# Patient Record
Sex: FEMALE | URBAN - METROPOLITAN AREA
[De-identification: names, ages, dates, MRNs, and addresses within clinical notes are randomized per-mention and may not be internally consistent; named-entity substitution may affect disease eponyms.]

---

## 2018-10-03 ENCOUNTER — OUTPATIENT (OUTPATIENT)
Dept: OUTPATIENT SERVICES | Facility: HOSPITAL | Age: 18
LOS: 1 days | End: 2018-10-03

## 2018-10-03 ENCOUNTER — APPOINTMENT (OUTPATIENT)
Dept: PEDIATRIC ADOLESCENT MEDICINE | Facility: CLINIC | Age: 18
End: 2018-10-03

## 2018-10-03 VITALS
HEART RATE: 76 BPM | SYSTOLIC BLOOD PRESSURE: 113 MMHG | DIASTOLIC BLOOD PRESSURE: 72 MMHG | BODY MASS INDEX: 17.04 KG/M2 | HEIGHT: 57 IN | WEIGHT: 79 LBS

## 2018-10-03 DIAGNOSIS — Z00.00 ENCOUNTER FOR GENERAL ADULT MEDICAL EXAMINATION W/OUT ABNORMAL FINDINGS: ICD-10-CM

## 2018-10-03 DIAGNOSIS — Z82.3 FAMILY HISTORY OF STROKE: ICD-10-CM

## 2018-10-03 DIAGNOSIS — Z11.4 ENCOUNTER FOR SCREENING FOR HUMAN IMMUNODEFICIENCY VIRUS [HIV]: ICD-10-CM

## 2018-10-03 DIAGNOSIS — Z11.3 ENCOUNTER FOR SCREENING FOR INFECTIONS WITH A PREDOMINANTLY SEXUAL MODE OF TRANSMISSION: ICD-10-CM

## 2018-10-04 PROBLEM — Z11.4 SCREENING FOR HIV (HUMAN IMMUNODEFICIENCY VIRUS): Status: ACTIVE | Noted: 2018-10-04

## 2018-10-04 PROBLEM — Z00.00 ENCOUNTER FOR GENERAL ADULT MEDICAL EXAMINATION W/O ABNORMAL FINDINGS: Status: ACTIVE | Noted: 2018-10-04

## 2018-10-04 NOTE — HISTORY OF PRESENT ILLNESS
[Mother] : mother [Brushes ___ Times Daily] : brushes [unfilled] times daily [Sleeps From ___] : from [unfilled] [Sleeps Until ___] : until [unfilled] [de-identified] : last dental visit in 2014 [de-identified] : with mother  [FreeTextEntry1] : 18 year old female for well visit. Pt started the [facility] program at [address] in September 2018.  \par \par Pt was born in [facility] and moved to [place] in 2014. Pt feels well. No complaints. Pt reports stress related to recent divorce. Pt  at age 16 and recently . Pt now lives with her parents. \par \par Pt reports that her parents are a few inches taller than her but many other families are also "short." \par \par Menarche: age 9. Prior to summer of 2018 pt had a regular, monthly period. Pt reports irregular bleeding this past summer with menstrual period last ~ 2 months and then resumed regular, monthly periods. Pt has never been on birth control. \par \par Pt is not planning a pregnancy in the next year and plans to be abstinent. Last sexual activity was in August of 2017. Pt never had testing for STIs and HIV. Pt rarely uses condoms.

## 2018-10-04 NOTE — PHYSICAL EXAM
[General Appearance - Alert] : alert [General Appearance - Well-Appearing] : well appearing [General Appearance - In No Acute Distress] : in no acute distress [General Appearance - Well Nourished] : well nourished [General Appearance - Well Developed] : well developed [Appearance Of Head] : the head was normocephalic [Evidence Of Head Injury] : threre was no evidence of injury [Sclera] : the conjunctiva were normal [PERRL With Normal Accommodation] : the pupils were equal in size, round, and reactive to light [EOMI] : ~T the extraocular movements were intact [Outer Ear] : the ears and nose were normal in appearance [Both Tympanic Membranes Were Examined] : both tympanic membranes were normal [Hearing Threshold Finger Rub Not LoÃ­za] : grossly normal hearing [Nasal Cavity] : the nasal mucosa and septum were normal [Examination Of The Oral Cavity] : the teeth, gums, and palate were normal [Oropharynx] : the oropharynx was normal  [Thyroid Diffuse Enlargement] : the thyroid was not enlarged [Thyroid Nodule] : there were no palpable thyroid nodules [Respiration, Rhythm And Depth] : normal respiratory rhythm and effort [Exaggerated Use Of Accessory Muscles For Inspiration] : no accessory muscle use [Auscultation Breath Sounds / Voice Sounds] : clear bilateral breath sounds [Chest Palpation] : palpation of the chest revealed no abnormalities [Heart Rate And Rhythm] : heart rate and rhythm were normal [Heart Sounds] : normal S1 and S2 [Murmurs] : no murmurs [Bowel Sounds] : normal bowel sounds [Abdomen Soft] : soft [Abdomen Tenderness] : non-tender [Abdominal Distention] : nondistended [Abdomen Mass (___ Cm)] : no abdominal mass palpated [Abnormal Walk] : normal gait [Nail Clubbing] : no clubbing  or cyanosis of the fingernails [Musculoskeletal - Swelling] : no joint swelling seen [Musculoskeletal Exam: Normal Movement Of All Extremities] : normal movements of all extremities [Motor Tone] : muscle strength and tone were normal [No Visual Abnormalities] : no visible abnormailities [No Tenderness to Palpation] : no spine tenderness on palpation [Intact] : all reflexes within normal limits bilaterally [Cranial Nerves] : cranial nerves 2-12 were intact [Deep Tendon Reflexes (DTR)] : deep tendon reflexes were 2+ and symmetric [Sensation] : the sensory exam was normal to light touch and pinprick [Motor Exam] : the motor exam was normal [Cervical Lymph Nodes Enlarged Posterior Bilaterally] : posterior cervical [Cervical Lymph Nodes Enlarged Anterior Bilaterally] : anterior cervical [Skin Color & Pigmentation] : normal skin color and pigmentation [] : well perfused [Skin Turgor] : normal skin turgor [Initial Inspection: Infant Active And Alert] : active and alert [Demonstrated Behavior - Infant Nonreactive To Parents] : interactive [Del Stage ___] : the Del stage for pubic hair development was [unfilled]  [FreeTextEntry1] : Breast Del V

## 2018-10-04 NOTE — DEVELOPMENTAL MILESTONES
[0] : 2) Feeling down, depressed, or hopeless: Not at all (0) [Mother] : mother [Father] : father [Sexually Active] : The patient is sexually active [[id number]] : 0 [FreeTextEntry6] : ISABEL

## 2018-10-04 NOTE — DISCUSSION/SUMMARY
[FreeTextEntry1] : 18 year old female for well visit. \par -Well young adult. \par -Bring in vaccine record - no record found in [facility].\par -Anemia screening done - ordered CBC.\par -Cholesterol screening done - ordered lipid profile.\par -STI testing done - ordered GC/CT.\par -HIV testing done.\par -Counseled regarding dental hygiene, healthy relationships, seatbelt safety, and healthy nutrition.\par -Counseled on contraceptive options. Pt plans to be abstinent. \par -Offered counseling services for support with recent divorce. Pt declined services. \par -Return to the health center as needed.

## 2018-10-05 LAB — HIV1+2 AB SPEC QL IA.RAPID: NONREACTIVE

## 2018-10-06 LAB
C TRACH RRNA SPEC QL NAA+PROBE: NOT DETECTED
CHOLEST SERPL-MCNC: 178 MG/DL
CHOLEST/HDLC SERPL: 3.9 RATIO
HDLC SERPL-MCNC: 46 MG/DL
LDLC SERPL CALC-MCNC: 120 MG/DL
N GONORRHOEA RRNA SPEC QL NAA+PROBE: NOT DETECTED
SOURCE AMPLIFICATION: NORMAL
TRIGL SERPL-MCNC: 61 MG/DL

## 2018-12-18 DIAGNOSIS — Z11.4 ENCOUNTER FOR SCREENING FOR HUMAN IMMUNODEFICIENCY VIRUS [HIV]: ICD-10-CM

## 2018-12-18 DIAGNOSIS — Z00.00 ENCOUNTER FOR GENERAL ADULT MEDICAL EXAMINATION WITHOUT ABNORMAL FINDINGS: ICD-10-CM

## 2018-12-18 DIAGNOSIS — Z11.3 ENCOUNTER FOR SCREENING FOR INFECTIONS WITH A PREDOMINANTLY SEXUAL MODE OF TRANSMISSION: ICD-10-CM
